# Patient Record
Sex: FEMALE | Race: WHITE | NOT HISPANIC OR LATINO
[De-identification: names, ages, dates, MRNs, and addresses within clinical notes are randomized per-mention and may not be internally consistent; named-entity substitution may affect disease eponyms.]

---

## 2019-10-24 PROBLEM — Z00.00 ENCOUNTER FOR PREVENTIVE HEALTH EXAMINATION: Status: ACTIVE | Noted: 2019-10-24

## 2019-10-29 ENCOUNTER — RECORD ABSTRACTING (OUTPATIENT)
Age: 49
End: 2019-10-29

## 2019-10-29 DIAGNOSIS — Z78.9 OTHER SPECIFIED HEALTH STATUS: ICD-10-CM

## 2019-10-29 DIAGNOSIS — Z82.49 FAMILY HISTORY OF ISCHEMIC HEART DISEASE AND OTHER DISEASES OF THE CIRCULATORY SYSTEM: ICD-10-CM

## 2019-10-29 DIAGNOSIS — Z87.39 PERSONAL HISTORY OF OTHER DISEASES OF THE MUSCULOSKELETAL SYSTEM AND CONNECTIVE TISSUE: ICD-10-CM

## 2019-10-29 LAB — CYTOLOGY CVX/VAG DOC THIN PREP: NEGATIVE

## 2020-01-15 ENCOUNTER — APPOINTMENT (OUTPATIENT)
Dept: OBGYN | Facility: CLINIC | Age: 50
End: 2020-01-15
Payer: COMMERCIAL

## 2020-01-15 ENCOUNTER — RESULT REVIEW (OUTPATIENT)
Age: 50
End: 2020-01-15

## 2020-01-15 VITALS
SYSTOLIC BLOOD PRESSURE: 120 MMHG | HEIGHT: 62 IN | BODY MASS INDEX: 19.32 KG/M2 | WEIGHT: 105 LBS | DIASTOLIC BLOOD PRESSURE: 70 MMHG

## 2020-01-15 DIAGNOSIS — Z12.31 ENCOUNTER FOR SCREENING MAMMOGRAM FOR MALIGNANT NEOPLASM OF BREAST: ICD-10-CM

## 2020-01-15 DIAGNOSIS — Z01.419 ENCOUNTER FOR GYNECOLOGICAL EXAMINATION (GENERAL) (ROUTINE) W/OUT ABNORMAL FINDINGS: ICD-10-CM

## 2020-01-15 DIAGNOSIS — Z11.51 ENCOUNTER FOR SCREENING FOR HUMAN PAPILLOMAVIRUS (HPV): ICD-10-CM

## 2020-01-15 DIAGNOSIS — R92.2 INCONCLUSIVE MAMMOGRAM: ICD-10-CM

## 2020-01-15 PROCEDURE — 99396 PREV VISIT EST AGE 40-64: CPT

## 2020-01-21 PROBLEM — Z11.51 SCREENING FOR HPV (HUMAN PAPILLOMAVIRUS): Status: ACTIVE | Noted: 2020-01-21

## 2020-01-21 NOTE — HISTORY OF PRESENT ILLNESS
[1 Year Ago] : 1 year ago [Good] : being in good health [Healthy Diet] : a healthy diet [Regular Exercise] : regular exercise [Definite:  ___ (Date)] : the last menstrual period was [unfilled] [Spotting Between  Menses] : no spotting between menses [Normal Amount/Duration] : was of a normal amount and duration [Menstrual Cramps] : no menstrual cramps [Regular Cycle Intervals] : periods have been irregular [Monogamous] : is monogamous [Sexually Active] : is sexually active

## 2020-05-20 ENCOUNTER — APPOINTMENT (OUTPATIENT)
Dept: OBGYN | Facility: CLINIC | Age: 50
End: 2020-05-20
Payer: COMMERCIAL

## 2020-05-20 ENCOUNTER — ASOB RESULT (OUTPATIENT)
Age: 50
End: 2020-05-20

## 2020-05-20 VITALS
BODY MASS INDEX: 19.32 KG/M2 | HEIGHT: 62 IN | SYSTOLIC BLOOD PRESSURE: 120 MMHG | WEIGHT: 105 LBS | DIASTOLIC BLOOD PRESSURE: 70 MMHG

## 2020-05-20 DIAGNOSIS — R10.2 PELVIC AND PERINEAL PAIN: ICD-10-CM

## 2020-05-20 PROCEDURE — 99213 OFFICE O/P EST LOW 20 MIN: CPT

## 2020-05-20 PROCEDURE — 76830 TRANSVAGINAL US NON-OB: CPT

## 2020-05-22 PROBLEM — R10.2 PELVIC PAIN: Status: ACTIVE | Noted: 2020-05-22

## 2020-05-22 NOTE — PROCEDURE
[Pelvic Pain] : pelvic pain [Transvaginal Ultrasound] : transvaginal ultrasound [FreeTextEntry3] : See attached

## 2020-12-23 PROBLEM — Z01.419 ENCOUNTER FOR ANNUAL ROUTINE GYNECOLOGICAL EXAMINATION: Status: RESOLVED | Noted: 2020-01-21 | Resolved: 2020-12-23

## 2020-12-23 PROBLEM — Z01.419 ENCOUNTER FOR ANNUAL ROUTINE GYNECOLOGICAL EXAMINATION: Status: RESOLVED | Noted: 2020-01-15 | Resolved: 2020-12-23

## 2021-04-30 ENCOUNTER — NON-APPOINTMENT (OUTPATIENT)
Age: 51
End: 2021-04-30

## 2021-05-26 ENCOUNTER — APPOINTMENT (OUTPATIENT)
Dept: OBGYN | Facility: CLINIC | Age: 51
End: 2021-05-26
Payer: COMMERCIAL

## 2021-05-26 ENCOUNTER — RESULT REVIEW (OUTPATIENT)
Age: 51
End: 2021-05-26

## 2021-05-26 VITALS
BODY MASS INDEX: 18.22 KG/M2 | HEIGHT: 62 IN | SYSTOLIC BLOOD PRESSURE: 120 MMHG | DIASTOLIC BLOOD PRESSURE: 70 MMHG | WEIGHT: 99 LBS

## 2021-05-26 PROCEDURE — 99072 ADDL SUPL MATRL&STAF TM PHE: CPT

## 2021-05-26 PROCEDURE — 99396 PREV VISIT EST AGE 40-64: CPT

## 2021-06-02 NOTE — HISTORY OF PRESENT ILLNESS
[FreeTextEntry1] : 50 y o P0 female presents for routine annual GYN care\par Last pap smear 1/2020 NILM HPV negative\par Reports normal bowel and bladder function\par Denies current GYN complaints\par Has monthly menses: normal flow/duration\par Breast imaging 2021 pending\par bone density 2018 normal\par

## 2021-06-09 ENCOUNTER — RESULT REVIEW (OUTPATIENT)
Age: 51
End: 2021-06-09

## 2022-04-15 DIAGNOSIS — Z12.39 ENCOUNTER FOR OTHER SCREENING FOR MALIGNANT NEOPLASM OF BREAST: ICD-10-CM

## 2022-07-20 ENCOUNTER — RESULT REVIEW (OUTPATIENT)
Age: 52
End: 2022-07-20

## 2022-10-20 ENCOUNTER — APPOINTMENT (OUTPATIENT)
Dept: OBGYN | Facility: CLINIC | Age: 52
End: 2022-10-20

## 2022-10-20 VITALS
WEIGHT: 104 LBS | BODY MASS INDEX: 19.14 KG/M2 | HEIGHT: 62 IN | SYSTOLIC BLOOD PRESSURE: 120 MMHG | DIASTOLIC BLOOD PRESSURE: 80 MMHG

## 2022-10-20 DIAGNOSIS — N95.1 MENOPAUSAL AND FEMALE CLIMACTERIC STATES: ICD-10-CM

## 2022-10-20 DIAGNOSIS — Z01.419 ENCOUNTER FOR GYNECOLOGICAL EXAMINATION (GENERAL) (ROUTINE) W/OUT ABNORMAL FINDINGS: ICD-10-CM

## 2022-10-20 PROCEDURE — 99396 PREV VISIT EST AGE 40-64: CPT

## 2022-10-20 RX ORDER — ADALIMUMAB 40MG/0.8ML
40 KIT SUBCUTANEOUS
Refills: 0 | Status: COMPLETED | COMMUNITY
End: 2022-10-20

## 2022-10-20 RX ORDER — ATORVASTATIN CALCIUM 40 MG/1
40 TABLET, FILM COATED ORAL DAILY
Refills: 0 | Status: COMPLETED | COMMUNITY
End: 2022-10-20

## 2022-10-20 RX ORDER — ADALIMUMAB 40MG/0.8ML
40 KIT SUBCUTANEOUS
Qty: 6 | Refills: 0 | Status: ACTIVE | COMMUNITY
Start: 2022-07-25

## 2022-10-20 NOTE — HISTORY OF PRESENT ILLNESS
[Patient reported bone density results were normal] : Patient reported bone density results were normal [Patient reported colonoscopy was normal] : Patient reported colonoscopy was normal [FreeTextEntry1] : 51yo  postmenopausal without HRT here for annual Gyn exam. Has occasional hot flashes. No Gyn complaints. Is followed by Rheumatologist and PCP at [facility]. [Mammogramdate] : 7/20/22 [TextBox_19] : BIRADS 1D [BreastSonogramDate] : 7/20/22 [TextBox_25] : BIRADS 1D [PapSmeardate] : 1/15/20 [TextBox_31] : Neg/HPV Neg [BoneDensityDate] : ~6 years ago at [facility]

## 2022-11-23 ENCOUNTER — NON-APPOINTMENT (OUTPATIENT)
Age: 52
End: 2022-11-23

## 2022-11-23 ENCOUNTER — APPOINTMENT (OUTPATIENT)
Dept: CARDIOLOGY | Facility: CLINIC | Age: 52
End: 2022-11-23

## 2022-11-23 VITALS
RESPIRATION RATE: 12 BRPM | HEIGHT: 62 IN | WEIGHT: 101 LBS | SYSTOLIC BLOOD PRESSURE: 121 MMHG | BODY MASS INDEX: 18.58 KG/M2 | HEART RATE: 66 BPM | OXYGEN SATURATION: 98 % | DIASTOLIC BLOOD PRESSURE: 74 MMHG

## 2022-11-23 DIAGNOSIS — Z78.9 OTHER SPECIFIED HEALTH STATUS: ICD-10-CM

## 2022-11-23 PROCEDURE — 93306 TTE W/DOPPLER COMPLETE: CPT

## 2022-11-23 PROCEDURE — 93000 ELECTROCARDIOGRAM COMPLETE: CPT

## 2022-11-23 PROCEDURE — 99204 OFFICE O/P NEW MOD 45 MIN: CPT

## 2022-11-23 RX ORDER — SIMVASTATIN 10 MG/1
10 TABLET, FILM COATED ORAL DAILY
Refills: 0 | Status: ACTIVE | COMMUNITY
Start: 2022-08-26

## 2022-11-23 NOTE — ASSESSMENT
[FreeTextEntry1] : 51 yo female with ankylosing spondylitis (on Humira), hyperlipidemia, and newly diagnosed paroxysmal supraventricular tachycardia on 11/19/22. \par Labs on 11/19 and 11/20 (including TSH and free T4) from [facility] hospitalization were reviewed today and found to be unremarkable.\par ECG today demonstrated sinus rhythm.\par \par Patient is pending echocardiogram later today in the office.\par After review of echo results, will determine if further cardiac work-up or intervention is clinically indicated.\par Will continue atenolol 25 mg po daily at this time given 1st occurrence of SVT.\par If patient should develop brief palpitations, she was advised to contact the office for further evaluation with FasterPantso XT monitor.\par If patient should have recurrence of SVT, patient was advised to go to ER for acute intervention.\par Should patient have recurrence despite atenolol or becomes intolerant of beta-blocker, will refer to EP for EP study and ablation procedure.

## 2022-11-23 NOTE — HISTORY OF PRESENT ILLNESS
[FreeTextEntry1] : 51 yo female with ankylosing spondylitis (on Humira) and hyperlipidemia, who presents today to establish cardiac cardiac following her recent hospitalization at [facility] from 11/19-11/20/22 for new onset paroxysmal supraventricular tachycardia. She states that she was moving boxes in her garage when she suddenly developed palpitations. In ER, she was found to be in supraventricular tachycardia with  bpm. She reports that she was instructed to do Valsalva maneuver in ER, which resulted in restoration of sinus rhythm but it is unclear if termination of SVT was immediately with Valsalva maneuver or later in the ER stay. Patient denies chest pain, dyspnea, syncope, edema, melena, hematochezia, or hematemesis. She is able to go up several flights of stairs without SOB/CP. She reports having a normal echocardiogram ~ 2 years ago in [place].\par

## 2022-11-28 ENCOUNTER — NON-APPOINTMENT (OUTPATIENT)
Age: 52
End: 2022-11-28

## 2023-07-03 ENCOUNTER — APPOINTMENT (OUTPATIENT)
Dept: CARDIOLOGY | Facility: CLINIC | Age: 53
End: 2023-07-03
Payer: COMMERCIAL

## 2023-07-03 ENCOUNTER — NON-APPOINTMENT (OUTPATIENT)
Age: 53
End: 2023-07-03

## 2023-07-03 VITALS
OXYGEN SATURATION: 97 % | WEIGHT: 105 LBS | BODY MASS INDEX: 19.32 KG/M2 | SYSTOLIC BLOOD PRESSURE: 114 MMHG | HEART RATE: 70 BPM | RESPIRATION RATE: 14 BRPM | HEIGHT: 62 IN | DIASTOLIC BLOOD PRESSURE: 76 MMHG

## 2023-07-03 DIAGNOSIS — R00.2 PALPITATIONS: ICD-10-CM

## 2023-07-03 DIAGNOSIS — U07.1 COVID-19: ICD-10-CM

## 2023-07-03 PROCEDURE — 99214 OFFICE O/P EST MOD 30 MIN: CPT

## 2023-07-03 PROCEDURE — 93000 ELECTROCARDIOGRAM COMPLETE: CPT

## 2023-07-03 NOTE — HISTORY OF PRESENT ILLNESS
[FreeTextEntry1] : 51 yo female with ankylosing spondylitis (on Humira), hyperlipidemia, and paroxysmal SVT diagnosed in 11/2022, who presents today for cardiac evaluation of palpitations which began following her COVID-19 infection on 6/12/23 with reported HR in 100s. She was evaluated at [facility] on 6/23/23 where ECG, labs, and CXR were unremarkable. She denies any recurrent of palpitations since that time. She also reports exertional SOB, but denies chest pain or fevers. Patient denies syncope, edema, melena, hematochezia, or hematemesis.\par \par

## 2023-07-03 NOTE — CARDIOLOGY SUMMARY
[de-identified] : \par 7/3/23 ECG: Sinus rhythm, rate 65 bpm\par 11/23/22 ECG: Sinus rhythm, rate 64 bpm\par  [de-identified] : \par 11/23/22 Echo: Normal LV size and systolic/diastolic function, LVEF 66%. Normal RV size and systolic function. Trace MR.

## 2023-07-03 NOTE — ASSESSMENT
[FreeTextEntry1] : 51 yo female with ankylosing spondylitis (on Humira), hyperlipidemia, paroxysmal SVT diagnosed in 11/2022, and palpitations and exertional SOB following her COVID-19 infection on 6/12/23.\par Labs from [place] on 6/23/23 (CBC, CMP, proBNP, TSH, free T4) were reviewed today and found to be unremarkable.\par ECG today demonstrated sinus rhythm.\par \par Given reported COVID-19 infection and subsequent exertional SOB, will perform echocardiogram to reassess LV function.\par Will continue atenolol 25 mg po daily at this time.\par If echo is unremarkable and patient continues to report exertional SOB, will order stress test.\par \par If patient should have recurrence of palpitations that are frequent or prolonged, will repeat Zio XT monitor.\par \par Will continue simvastatin 10 mg po daily for hyperlipidemia management.\par Will order coronary calcium score for further risk stratification.

## 2023-07-26 ENCOUNTER — APPOINTMENT (OUTPATIENT)
Dept: CARDIOLOGY | Facility: CLINIC | Age: 53
End: 2023-07-26
Payer: COMMERCIAL

## 2023-07-26 PROCEDURE — 93306 TTE W/DOPPLER COMPLETE: CPT

## 2023-07-27 ENCOUNTER — NON-APPOINTMENT (OUTPATIENT)
Age: 53
End: 2023-07-27

## 2023-07-27 ENCOUNTER — RESULT REVIEW (OUTPATIENT)
Age: 53
End: 2023-07-27

## 2023-12-05 RX ORDER — ATENOLOL 25 MG/1
25 TABLET ORAL
Qty: 90 | Refills: 3 | Status: ACTIVE | COMMUNITY
Start: 2022-11-20

## 2024-05-09 ENCOUNTER — NON-APPOINTMENT (OUTPATIENT)
Age: 54
End: 2024-05-09

## 2024-05-09 ENCOUNTER — APPOINTMENT (OUTPATIENT)
Dept: CARDIOLOGY | Facility: CLINIC | Age: 54
End: 2024-05-09
Payer: COMMERCIAL

## 2024-05-09 VITALS
DIASTOLIC BLOOD PRESSURE: 76 MMHG | OXYGEN SATURATION: 100 % | HEART RATE: 58 BPM | RESPIRATION RATE: 14 BRPM | WEIGHT: 108 LBS | SYSTOLIC BLOOD PRESSURE: 123 MMHG | BODY MASS INDEX: 19.75 KG/M2

## 2024-05-09 DIAGNOSIS — I47.10 SUPRAVENTRICULAR TACHYCARDIA, UNSPECIFIED: ICD-10-CM

## 2024-05-09 DIAGNOSIS — E78.5 HYPERLIPIDEMIA, UNSPECIFIED: ICD-10-CM

## 2024-05-09 PROCEDURE — 93000 ELECTROCARDIOGRAM COMPLETE: CPT

## 2024-05-09 PROCEDURE — 99214 OFFICE O/P EST MOD 30 MIN: CPT

## 2024-05-10 NOTE — ASSESSMENT
[FreeTextEntry1] : 51 yo female with ankylosing spondylitis (on Humira), hyperlipidemia, paroxysmal SVT diagnosed in 11/2022, and exertional SOB. Echo on 7/26/23 demonstrated normal LV size and systolic/diastolic function, LVEF 59%, normal RV size and systolic function, and mild MR. ECG today demonstrated sinus rhythm.  Given reported exertional SOB, Will perform treadmill stress ECG for ischemic evaluation/risk stratification. After review of stress test results, will determine if further cardiac work-up or intervention is clinically indicated.  No recurrence of significant palpitations to suggest recurrence of SVT. Will continue atenolol 25 mg po daily at this time. If patient should have recurrence of palpitations that are frequent or prolonged, will repeat Zio XT monitor.  Will continue simvastatin 10 mg po daily for hyperlipidemia management. Will order coronary calcium score for further risk stratification.

## 2024-05-10 NOTE — CARDIOLOGY SUMMARY
[de-identified] : 5/9/24 ECG: Sinus rhythm, rate 62 bpm 7/3/23 ECG: Sinus rhythm, rate 65 bpm [de-identified] : 7/26/23 Echo: Normal LV size and systolic/diastolic function, LVEF 59%. Normal RV size and systolic function. Mild MR. 11/23/22 Echo: Normal LV size and systolic/diastolic function, LVEF 66%. Normal RV size and systolic function. Trace MR. [de-identified] : 7/27/23 Coronary calcium score: Total Agatston score = 0

## 2024-05-10 NOTE — HISTORY OF PRESENT ILLNESS
[FreeTextEntry1] : 54 yo female with ankylosing spondylitis (on Humira), hyperlipidemia, and paroxysmal SVT diagnosed in 11/2022, who presents today for follow-up. She reports new exertional dyspnea. She does not exercise regularly. Patient denies chest pain, palpitations, syncope, edema, melena, hematochezia, or hematemesis.

## 2024-06-03 ENCOUNTER — APPOINTMENT (OUTPATIENT)
Dept: CARDIOLOGY | Facility: CLINIC | Age: 54
End: 2024-06-03
Payer: COMMERCIAL

## 2024-06-03 DIAGNOSIS — R06.02 SHORTNESS OF BREATH: ICD-10-CM

## 2024-06-03 PROCEDURE — 93015 CV STRESS TEST SUPVJ I&R: CPT

## 2024-12-02 ENCOUNTER — RX RENEWAL (OUTPATIENT)
Age: 54
End: 2024-12-02